# Patient Record
Sex: FEMALE | Race: WHITE | NOT HISPANIC OR LATINO | Employment: FULL TIME | URBAN - METROPOLITAN AREA
[De-identification: names, ages, dates, MRNs, and addresses within clinical notes are randomized per-mention and may not be internally consistent; named-entity substitution may affect disease eponyms.]

---

## 2018-02-28 ENCOUNTER — CLINICAL SUPPORT (OUTPATIENT)
Dept: OTHER | Facility: CLINIC | Age: 33
End: 2018-02-28
Payer: COMMERCIAL

## 2018-02-28 DIAGNOSIS — Z00.8 HEALTH EXAMINATION IN POPULATION SURVEYS: Primary | ICD-10-CM

## 2018-02-28 PROCEDURE — 80061 LIPID PANEL: CPT | Mod: QW,S$GLB,, | Performed by: INTERNAL MEDICINE

## 2018-02-28 PROCEDURE — 99401 PREV MED CNSL INDIV APPRX 15: CPT | Mod: S$GLB,,, | Performed by: INTERNAL MEDICINE

## 2018-02-28 PROCEDURE — 82947 ASSAY GLUCOSE BLOOD QUANT: CPT | Mod: QW,S$GLB,, | Performed by: INTERNAL MEDICINE

## 2018-03-01 VITALS
HEIGHT: 64 IN | DIASTOLIC BLOOD PRESSURE: 80 MMHG | WEIGHT: 293 LBS | SYSTOLIC BLOOD PRESSURE: 116 MMHG | BODY MASS INDEX: 50.02 KG/M2

## 2018-03-01 LAB
GLUCOSE SERPL-MCNC: 107 MG/DL (ref 60–140)
POC CHOLESTEROL, HDL: 49 MG/DL (ref 40–?)
POC CHOLESTEROL, LDL: 77 MG/DL (ref ?–160)
POC CHOLESTEROL, TOTAL: 158 MG/DL (ref ?–240)
POC GLUCOSE FASTING: NORMAL MG/DL (ref 60–110)
POC TOTAL CHOLESTEROL / HDL RATIO: 3.22 (ref ?–6)
POC TRIGLYCERIDES: 159 MG/DL (ref ?–160)

## 2019-03-28 ENCOUNTER — CLINICAL SUPPORT (OUTPATIENT)
Dept: OTHER | Facility: CLINIC | Age: 34
End: 2019-03-28
Payer: COMMERCIAL

## 2019-03-28 DIAGNOSIS — Z00.8 ENCOUNTER FOR OTHER GENERAL EXAMINATION: ICD-10-CM

## 2019-03-30 LAB
GLUCOSE SERPL-MCNC: 85 MG/DL (ref 60–140)
HDLC SERPL-MCNC: 50 MG/DL
POC CHOLESTEROL, LDL (DOCK): 92 MG/DL
POC CHOLESTEROL, TOTAL: 159 MG/DL
TRIGL SERPL-MCNC: 85 MG/DL

## 2021-10-15 ENCOUNTER — CLINICAL SUPPORT (OUTPATIENT)
Dept: OTHER | Facility: CLINIC | Age: 36
End: 2021-10-15
Payer: COMMERCIAL

## 2021-10-15 DIAGNOSIS — Z00.8 ENCOUNTER FOR OTHER GENERAL EXAMINATION: ICD-10-CM

## 2021-10-15 PROCEDURE — 80061 PR  LIPID PANEL: ICD-10-PCS | Mod: QW,S$GLB,, | Performed by: INTERNAL MEDICINE

## 2021-10-15 PROCEDURE — 80061 LIPID PANEL: CPT | Mod: QW,S$GLB,, | Performed by: INTERNAL MEDICINE

## 2021-10-15 PROCEDURE — 82947 ASSAY GLUCOSE BLOOD QUANT: CPT | Mod: QW,S$GLB,, | Performed by: INTERNAL MEDICINE

## 2021-10-15 PROCEDURE — 99401 PR PREVENT COUNSEL,INDIV,15 MIN: ICD-10-PCS | Mod: 25,S$GLB,, | Performed by: INTERNAL MEDICINE

## 2021-10-15 PROCEDURE — 82947 PR  ASSAY QUANTITATIVE,BLOOD GLUCOSE: ICD-10-PCS | Mod: QW,S$GLB,, | Performed by: INTERNAL MEDICINE

## 2021-10-15 PROCEDURE — 99401 PREV MED CNSL INDIV APPRX 15: CPT | Mod: 25,S$GLB,, | Performed by: INTERNAL MEDICINE

## 2021-10-16 VITALS — BODY MASS INDEX: 57.85 KG/M2 | HEIGHT: 64 IN

## 2021-10-16 LAB
GLUCOSE SERPL-MCNC: 126 MG/DL (ref 60–140)
HDLC SERPL-MCNC: 42 MG/DL
POC CHOLESTEROL, LDL (DOCK): 100 MG/DL
POC CHOLESTEROL, TOTAL: 162 MG/DL
TRIGL SERPL-MCNC: 102 MG/DL

## 2023-10-20 ENCOUNTER — OFFICE VISIT (OUTPATIENT)
Dept: URGENT CARE | Facility: CLINIC | Age: 38
End: 2023-10-20
Payer: COMMERCIAL

## 2023-10-20 VITALS
HEIGHT: 65 IN | OXYGEN SATURATION: 98 % | HEART RATE: 82 BPM | WEIGHT: 293 LBS | RESPIRATION RATE: 18 BRPM | TEMPERATURE: 98 F | BODY MASS INDEX: 48.82 KG/M2 | SYSTOLIC BLOOD PRESSURE: 126 MMHG | DIASTOLIC BLOOD PRESSURE: 82 MMHG

## 2023-10-20 DIAGNOSIS — J20.9 ACUTE BRONCHITIS, UNSPECIFIED ORGANISM: Primary | ICD-10-CM

## 2023-10-20 DIAGNOSIS — R05.9 COUGH, UNSPECIFIED TYPE: ICD-10-CM

## 2023-10-20 LAB
CTP QC/QA: YES
CTP QC/QA: YES
POC MOLECULAR INFLUENZA A AGN: NEGATIVE
POC MOLECULAR INFLUENZA B AGN: NEGATIVE
SARS-COV-2 AG RESP QL IA.RAPID: NEGATIVE

## 2023-10-20 PROCEDURE — 99203 OFFICE O/P NEW LOW 30 MIN: CPT | Mod: S$GLB,,, | Performed by: FAMILY MEDICINE

## 2023-10-20 PROCEDURE — 87502 POCT INFLUENZA A/B MOLECULAR: ICD-10-PCS | Mod: QW,S$GLB,, | Performed by: FAMILY MEDICINE

## 2023-10-20 PROCEDURE — 99203 PR OFFICE/OUTPT VISIT, NEW, LEVL III, 30-44 MIN: ICD-10-PCS | Mod: S$GLB,,, | Performed by: FAMILY MEDICINE

## 2023-10-20 PROCEDURE — 87502 INFLUENZA DNA AMP PROBE: CPT | Mod: QW,S$GLB,, | Performed by: FAMILY MEDICINE

## 2023-10-20 PROCEDURE — 87811 SARS CORONAVIRUS 2 ANTIGEN POCT, MANUAL READ: ICD-10-PCS | Mod: QW,S$GLB,, | Performed by: FAMILY MEDICINE

## 2023-10-20 PROCEDURE — 87811 SARS-COV-2 COVID19 W/OPTIC: CPT | Mod: QW,S$GLB,, | Performed by: FAMILY MEDICINE

## 2023-10-20 RX ORDER — PROMETHAZINE HYDROCHLORIDE AND DEXTROMETHORPHAN HYDROBROMIDE 6.25; 15 MG/5ML; MG/5ML
5 SYRUP ORAL EVERY 4 HOURS PRN
Qty: 180 ML | Refills: 0 | Status: SHIPPED | OUTPATIENT
Start: 2023-10-20 | End: 2023-10-27

## 2023-10-20 RX ORDER — ALBUTEROL SULFATE 90 UG/1
2 AEROSOL, METERED RESPIRATORY (INHALATION) EVERY 6 HOURS PRN
Qty: 18 G | Refills: 0 | Status: SHIPPED | OUTPATIENT
Start: 2023-10-20 | End: 2023-11-19

## 2023-10-20 NOTE — PATIENT INSTRUCTIONS
Thank you for letting our team take care of you today. Your diagnoses are acute bronchitis and cough. Covid and Flu testing today is negative. Handouts for both are attached. Rest. Stay hydrated. Adding Albuterol inhaler to use at least three times a day and as needed. Also adding Promethazine DM to use for cough. If any symptoms worsen or continue, more testing/treatment may be needed. Get an appointment with your primary care or followup here. If any severe symptoms, ER evaluation.

## 2023-10-20 NOTE — PROGRESS NOTES
"Subjective:      Patient ID: Yulia Thao is a 38 y.o. female.    Vitals:  height is 5' 5" (1.651 m) and weight is 136.5 kg (301 lb) (abnormal). Her oral temperature is 97.9 °F (36.6 °C). Her blood pressure is 126/82 and her pulse is 82. Her respiration is 18 and oxygen saturation is 98%.     Chief Complaint: Cough and Nasal Congestion    37 yo female who presents today with clear runny nose x1wk and then cough x 1 day. The cough is mostly dry. No other symptoms. No known sick contacts. She has had acute bronchitis in the past. Tried Dayquil & Nyquil without relief.     Cough  This is a new problem. The problem has been gradually worsening. The problem occurs every few minutes. The cough is Non-productive. Associated symptoms include rhinorrhea. Pertinent negatives include no chest pain, chills, ear congestion, ear pain, fever, headaches, heartburn, hemoptysis, myalgias, nasal congestion, postnasal drip, rash, sore throat, shortness of breath, sweats, weight loss or wheezing. The symptoms are aggravated by lying down. She has tried OTC cough suppressant for the symptoms. The treatment provided mild relief. Her past medical history is significant for bronchitis. There is no history of asthma, bronchiectasis, COPD, emphysema, environmental allergies or pneumonia.       Constitution: Negative. Negative for chills and fever.   HENT:  Negative for ear pain, nosebleeds, postnasal drip and sore throat.         Rhinorrhea   Neck: neck negative.   Cardiovascular: Negative.  Negative for chest pain.   Eyes: Negative.    Respiratory:  Positive for cough. Negative for bloody sputum, shortness of breath and wheezing.    Gastrointestinal: Negative.  Negative for heartburn.   Genitourinary: Negative.    Musculoskeletal: Negative.  Negative for muscle ache.   Skin: Negative.  Negative for rash.   Allergic/Immunologic: Negative for environmental allergies and sneezing.   Neurological: Negative.  Negative for headaches. "   Hematologic/Lymphatic: Negative.    Psychiatric/Behavioral: Negative.        Objective:     Vitals:    10/20/23 1700   BP: 126/82   Pulse: 82   Resp: 18   Temp: 97.9 °F (36.6 °C)       Physical Exam   Constitutional: She is oriented to person, place, and time.   HENT:   Head: Normocephalic and atraumatic.   Ears:   Right Ear: Tympanic membrane, external ear and ear canal normal.   Left Ear: Tympanic membrane, external ear and ear canal normal.   Nose: Rhinorrhea present. No congestion.   Mouth/Throat: Mucous membranes are moist. No oropharyngeal exudate or posterior oropharyngeal erythema. Oropharynx is clear.   Eyes: Conjunctivae are normal. Pupils are equal, round, and reactive to light. Extraocular movement intact   Neck: Neck supple.   Cardiovascular: Normal rate, regular rhythm, normal heart sounds and normal pulses.   Pulmonary/Chest: Effort normal and breath sounds normal. No respiratory distress. She has no wheezes.   Abdominal: Normal appearance. Soft.   Neurological: no focal deficit. She is alert and oriented to person, place, and time.   Skin: Skin is warm and no rash.         Comments: Normal skin turgor. No acute focal rashes appreciated.   Psychiatric: Her behavior is normal. Mood, judgment and thought content normal.   Nursing note and vitals reviewed.      Assessment:     1. Acute bronchitis, unspecified organism    2. Cough, unspecified type        Plan:       Acute bronchitis, unspecified organism  -     albuterol (VENTOLIN HFA) 90 mcg/actuation inhaler; Inhale 2 puffs into the lungs every 6 (six) hours as needed (bronchitis). Rescue  Dispense: 18 g; Refill: 0  -     promethazine-dextromethorphan (PROMETHAZINE-DM) 6.25-15 mg/5 mL Syrp; Take 5 mLs by mouth every 4 (four) hours as needed (cough).  Dispense: 180 mL; Refill: 0    Cough, unspecified type  -     SARS Coronavirus 2 Antigen, POCT Manual Read  -     POCT Influenza A/B MOLECULAR  -     albuterol (VENTOLIN HFA) 90 mcg/actuation inhaler;  Inhale 2 puffs into the lungs every 6 (six) hours as needed (bronchitis). Rescue  Dispense: 18 g; Refill: 0  -     promethazine-dextromethorphan (PROMETHAZINE-DM) 6.25-15 mg/5 mL Syrp; Take 5 mLs by mouth every 4 (four) hours as needed (cough).  Dispense: 180 mL; Refill: 0      Results for orders placed or performed in visit on 10/20/23   SARS Coronavirus 2 Antigen, POCT Manual Read   Result Value Ref Range    SARS Coronavirus 2 Antigen Negative Negative     Acceptable Yes    POCT Influenza A/B MOLECULAR   Result Value Ref Range    POC Molecular Influenza A Ag Negative Negative, Not Reported    POC Molecular Influenza B Ag Negative Negative, Not Reported     Acceptable Yes          SUMMARY: Clinically, the history and exam support a diagnosis of acute bronchitis with cough. Covid and Flu negative today. Supportive measures. Adding Albuterol and Promethazine DM which she has been on before with no problems. Rest. Monitor for any new or worsening symptoms. If this occurs, get a medical provider evaluation. Further testing/treatment may be needed at that time.     Patient Instructions   Thank you for letting our team take care of you today. Your diagnoses are acute bronchitis and cough. Covid and Flu testing today is negative. Handouts for both are attached. Rest. Stay hydrated. Adding Albuterol inhaler to use at least three times a day and as needed. Also adding Promethazine DM to use for cough. If any symptoms worsen or continue, more testing/treatment may be needed. Get an appointment with your primary care or followup here. If any severe symptoms, ER evaluation.   A handout on bronchitis and cough attached.

## 2024-10-15 ENCOUNTER — OFFICE VISIT (OUTPATIENT)
Dept: URGENT CARE | Facility: CLINIC | Age: 39
End: 2024-10-15
Payer: COMMERCIAL

## 2024-10-15 VITALS
RESPIRATION RATE: 18 BRPM | BODY MASS INDEX: 47.67 KG/M2 | WEIGHT: 286.13 LBS | OXYGEN SATURATION: 98 % | HEIGHT: 65 IN | HEART RATE: 85 BPM | DIASTOLIC BLOOD PRESSURE: 82 MMHG | TEMPERATURE: 98 F | SYSTOLIC BLOOD PRESSURE: 125 MMHG

## 2024-10-15 DIAGNOSIS — R09.81 SINUS CONGESTION: ICD-10-CM

## 2024-10-15 DIAGNOSIS — H65.02 ACUTE SEROUS OTITIS MEDIA OF LEFT EAR, RECURRENCE NOT SPECIFIED: Primary | ICD-10-CM

## 2024-10-15 DIAGNOSIS — R09.82 POSTNASAL DRIP: ICD-10-CM

## 2024-10-15 PROCEDURE — 99213 OFFICE O/P EST LOW 20 MIN: CPT | Mod: S$GLB,,, | Performed by: NURSE PRACTITIONER

## 2024-10-15 RX ORDER — FUROSEMIDE 40 MG/1
40 TABLET ORAL
COMMUNITY
Start: 2024-09-16 | End: 2025-09-16

## 2024-10-15 RX ORDER — PANTOPRAZOLE SODIUM 40 MG/1
1 TABLET, DELAYED RELEASE ORAL DAILY
COMMUNITY
Start: 2024-08-12

## 2024-10-15 RX ORDER — AMOXICILLIN 500 MG/1
500 TABLET, FILM COATED ORAL EVERY 12 HOURS
Qty: 20 TABLET | Refills: 0 | Status: SHIPPED | OUTPATIENT
Start: 2024-10-15 | End: 2024-10-25

## 2024-10-15 RX ORDER — BENZONATATE 200 MG/1
200 CAPSULE ORAL 3 TIMES DAILY PRN
Qty: 25 CAPSULE | Refills: 0 | Status: SHIPPED | OUTPATIENT
Start: 2024-10-15 | End: 2024-10-25

## 2024-10-15 RX ORDER — TIRZEPATIDE 10 MG/.5ML
INJECTION, SOLUTION SUBCUTANEOUS
COMMUNITY

## 2024-10-15 NOTE — LETTER
October 15, 2024      [facility] [place]  [address], [address]  [facility] [address]  Phone: [phone]  Fax: [phone]       Patient: Yulia Thao   YOB: 1985  Date of Visit: 10/15/2024    To Whom It May Concern:    Silvio Thao  was at [facility] on 10/15/2024. The patient may return to work/school on 10/17/24 with no restrictions. If you have any questions or concerns, or if I can be of further assistance, please do not hesitate to contact me.    Sincerely,      Yola Benson, NP

## 2024-10-15 NOTE — PATIENT INSTRUCTIONS
Ear Infection:  Take full course of antibiotics as prescribed.  Flonase Nasal [place] as directed for nasal congestion  Over the Counter Claritin or Zyrtec (plain) as directed for allergy symptoms  Tylenol or Motrin every 4 - 6 hours as needed for fever or ear pain.  Humidifier use at home.  Apply a warm compresses to affected ear  Elevate head on a pillow at night     Follow up with your PCP in 1 week of initiating antibiotics or sooner for no improvement in symptoms    Follow up in the ER for any worsening of symptoms such as new fever, increasing ear pain, neck stiffness, shortness of breath, etc.  If you smoke, stop smoking.    Please arrange follow up with your primary medical clinic as soon as possible. You must understand that you've received an Urgent Care treatment only and that you may be released before all of your medical problems are known or treated. You, the patient, will arrange for follow up as instructed. If your symptoms worsen or fail to improve you should go to the Emergency Room.

## 2024-10-15 NOTE — PROGRESS NOTES
"Subjective:      Patient ID: Yulia Thao is a 39 y.o. female.    Vitals:  height is 5' 5" (1.651 m) and weight is 129.8 kg (286 lb 1.6 oz). Her oral temperature is 98.3 °F (36.8 °C). Her blood pressure is 125/82 and her pulse is 85. Her respiration is 18 and oxygen saturation is 98%.     Chief Complaint: Sinus Problem    Patient is a 40 yo female who presents for evaluation of sinus pressure, nasal congestion and sore throat. Onset Saturday. Bilateral ear pain started last night. States her  had similar symptoms last week, he tested negative for flu and covid. Denies recent travel. Denies fever, dyspnea, wheezing, vomiting, diarrhea, rash. No other concerns were voiced.     Sinus Problem  This is a new problem. The current episode started in the past 7 days. The problem has been gradually worsening since onset. There has been no fever. Associated symptoms include congestion, coughing, ear pain, sinus pressure and a sore throat. Pertinent negatives include no chills, headaches, shortness of breath or sneezing. Past treatments include oral decongestants. The treatment provided mild relief.       Constitution: Negative for chills and fever.   HENT:  Positive for ear pain, congestion, postnasal drip, sinus pressure and sore throat.    Respiratory:  Positive for cough. Negative for sputum production, shortness of breath, stridor and wheezing.    Gastrointestinal:  Negative for vomiting and diarrhea.   Musculoskeletal:  Negative for muscle ache.   Skin:  Negative for rash.   Allergic/Immunologic: Negative for sneezing.   Neurological:  Negative for headaches.      Objective:     Physical Exam   Constitutional: She is oriented to person, place, and time. She appears well-developed. She is cooperative.  Non-toxic appearance. She does not appear ill. No distress.   HENT:   Head: Normocephalic and atraumatic.   Ears:   Right Ear: Hearing, tympanic membrane, external ear and ear canal normal.   Left Ear: Hearing, " external ear and ear canal normal. There is tenderness. No no drainage or swelling. No mastoid tenderness. Tympanic membrane is erythematous. Tympanic membrane is not perforated and not bulging.   Nose: Nose normal. No mucosal edema, rhinorrhea or nasal deformity. No epistaxis. Right sinus exhibits no maxillary sinus tenderness and no frontal sinus tenderness. Left sinus exhibits no maxillary sinus tenderness and no frontal sinus tenderness.   Mouth/Throat: Uvula is midline, oropharynx is clear and moist and mucous membranes are normal. No trismus in the jaw. Normal dentition. No uvula swelling. No oropharyngeal exudate, posterior oropharyngeal edema or posterior oropharyngeal erythema.   Eyes: Conjunctivae and lids are normal. No scleral icterus.   Neck: Trachea normal and phonation normal. Neck supple. No edema present. No erythema present. No neck rigidity present.   Cardiovascular: Normal rate, regular rhythm, normal heart sounds and normal pulses.   Pulmonary/Chest: Effort normal and breath sounds normal. No respiratory distress. She has no decreased breath sounds. She has no rhonchi.   Abdominal: Normal appearance.   Musculoskeletal: Normal range of motion.         General: No deformity. Normal range of motion.   Neurological: She is alert and oriented to person, place, and time. She exhibits normal muscle tone. Coordination normal.   Skin: Skin is warm, dry, intact, not diaphoretic and not pale.   Psychiatric: Her speech is normal and behavior is normal. Judgment and thought content normal.   Nursing note and vitals reviewed.      Assessment:     1. Acute serous otitis media of left ear, recurrence not specified    2. Sinus congestion    3. Postnasal drip        Plan:       Acute serous otitis media of left ear, recurrence not specified    Sinus congestion    Postnasal drip    Other orders  -     amoxicillin (AMOXIL) 500 MG Tab; Take 1 tablet (500 mg total) by mouth every 12 (twelve) hours. for 10 days   Dispense: 20 tablet; Refill: 0  -     benzonatate (TESSALON) 200 MG capsule; Take 1 capsule (200 mg total) by mouth 3 (three) times daily as needed for Cough.  Dispense: 25 capsule; Refill: 0          Medical Decision Making:   Initial Assessment:   Nontoxic appearing 40 yo female c/o congestion.  Exam and history most consistent with AOM. I have a low suspicion at this time for mastoiditis, malignant otitis externa, herpes or giraldo hunt syndrome, or retained foreign body.  Given clinical presentation and duration of illness I have provided patient with prescription for amoxicillin.  Instructions for supportive care to include use of Tylenol/ibuprofen for fever and pain control as well as Mucinex for congestion.  Cautious return precautions discussed with full understanding.         Patient Instructions   Ear Infection:  Take full course of antibiotics as prescribed.  Flonase Nasal [place] as directed for nasal congestion  Over the Counter Claritin or Zyrtec (plain) as directed for allergy symptoms  Tylenol or Motrin every 4 - 6 hours as needed for fever or ear pain.  Humidifier use at home.  Apply a warm compresses to affected ear  Elevate head on a pillow at night     Follow up with your PCP in 1 week of initiating antibiotics or sooner for no improvement in symptoms    Follow up in the ER for any worsening of symptoms such as new fever, increasing ear pain, neck stiffness, shortness of breath, etc.  If you smoke, stop smoking.    Please arrange follow up with your primary medical clinic as soon as possible. You must understand that you've received an Urgent Care treatment only and that you may be released before all of your medical problems are known or treated. You, the patient, will arrange for follow up as instructed. If your symptoms worsen or fail to improve you should go to the Emergency Room.